# Patient Record
Sex: MALE | Race: WHITE
[De-identification: names, ages, dates, MRNs, and addresses within clinical notes are randomized per-mention and may not be internally consistent; named-entity substitution may affect disease eponyms.]

---

## 2019-10-09 ENCOUNTER — HOSPITAL ENCOUNTER (OUTPATIENT)
Dept: HOSPITAL 92 - SDC | Age: 39
Discharge: HOME | End: 2019-10-09
Attending: SURGERY
Payer: COMMERCIAL

## 2019-10-09 VITALS — BODY MASS INDEX: 37.1 KG/M2

## 2019-10-09 DIAGNOSIS — N50.0: ICD-10-CM

## 2019-10-09 DIAGNOSIS — N99.843: Primary | ICD-10-CM

## 2019-10-09 LAB
ANION GAP SERPL CALC-SCNC: 12 MMOL/L (ref 10–20)
BASOPHILS # BLD AUTO: 0.1 THOU/UL (ref 0–0.2)
BASOPHILS NFR BLD AUTO: 0.7 % (ref 0–1)
BUN SERPL-MCNC: 17 MG/DL (ref 8.9–20.6)
CALCIUM SERPL-MCNC: 9.2 MG/DL (ref 7.8–10.44)
CHLORIDE SERPL-SCNC: 106 MMOL/L (ref 98–107)
CO2 SERPL-SCNC: 23 MMOL/L (ref 22–29)
CREAT CL PREDICTED SERPL C-G-VRATE: 172 ML/MIN (ref 70–130)
EOSINOPHIL # BLD AUTO: 0.1 THOU/UL (ref 0–0.7)
EOSINOPHIL NFR BLD AUTO: 1.2 % (ref 0–10)
GLUCOSE SERPL-MCNC: 97 MG/DL (ref 70–105)
HGB BLD-MCNC: 15.9 G/DL (ref 14–18)
LYMPHOCYTES # BLD: 2.8 THOU/UL (ref 1.2–3.4)
LYMPHOCYTES NFR BLD AUTO: 25 % (ref 21–51)
MCH RBC QN AUTO: 30.6 PG (ref 27–31)
MCV RBC AUTO: 88.4 FL (ref 78–98)
MONOCYTES # BLD AUTO: 0.9 THOU/UL (ref 0.11–0.59)
MONOCYTES NFR BLD AUTO: 8 % (ref 0–10)
NEUTROPHILS # BLD AUTO: 7.2 THOU/UL (ref 1.4–6.5)
NEUTROPHILS NFR BLD AUTO: 65.2 % (ref 42–75)
PLATELET # BLD AUTO: 223 THOU/UL (ref 130–400)
POTASSIUM SERPL-SCNC: 4.4 MMOL/L (ref 3.5–5.1)
RBC # BLD AUTO: 5.21 MILL/UL (ref 4.7–6.1)
SODIUM SERPL-SCNC: 137 MMOL/L (ref 136–145)
WBC # BLD AUTO: 11 THOU/UL (ref 4.8–10.8)

## 2019-10-09 PROCEDURE — 80048 BASIC METABOLIC PNL TOTAL CA: CPT

## 2019-10-09 PROCEDURE — 85025 COMPLETE CBC W/AUTO DIFF WBC: CPT

## 2019-10-09 PROCEDURE — 0V950ZZ DRAINAGE OF SCROTUM, OPEN APPROACH: ICD-10-PCS | Performed by: SURGERY

## 2019-10-09 PROCEDURE — 36415 COLL VENOUS BLD VENIPUNCTURE: CPT

## 2019-10-17 NOTE — PDOC.OP
Operative Note





- Operative Note


Operative Note: 





PROCEDURE: Drainage debridement and imbrication of chronic seroma of the left 

scrotum





SURGEON: Sotero Middleton M.D.





DATE: 10/9/2019





PREOPERATIVE DIAGNOSIS: Chronic seroma of the left scrotum





POSTOPERATIVE DIAGNOSIS: Chronic seroma the left scrotum





HISTORY: Patient is status post emergent repair of massive incarcerated left 

inguinal hernia. He is healed well from this but developed a seroma cavity in 

the potential space remaining after removal of the hernia sac. This has been 

aspirated and drained but has recurred. He was recommended that he return to 

the operating room for debridement and imbrication of the chronic seroma cavity.





PROCEDURE IN DETAIL: After informed consent was obtained and appropriate 

preoperative antibiotics administered the patient was taken to the operating 

room he was placed in supine position and general anesthesia was administered. 

He was prepped and draped in the standard sterile fashion including the entire 

scrotum and penis into the operative prep. An ultrasound was sterilely draped 

and used to identify the location of the seroma where most closely approached 

the skin. Local anesthesia was infused over this and dissection carried down to 

the seroma cavity which was opened and drained. This was immediately adjacent 

to the atrophic testicle but the testicle was not visible. Due to postoperative 

scarring the location of the cord structures could not be clearly identified so 

excision of the seroma was felt to be too likely to endanger the structures. 

Therefore the chronic serosal lies lining of the cavity was extensively 

mechanically debrided and also cauterized and the seroma cavity was imbricated 

with a running V-Lock Vicryl suture. A YARIEL drain was placed into the seroma 

cavity taking care not to include this in the suture imbrication. This was 

drawn out through the left inguinal area laterally and secured to the skin with 

a nylon suture. The subcutaneous tissues were reapproximated with 3-0 Monocryl 

suture and the skin was closed with a running 4-0 subcuticular suture. The 

patient was extubated and taken to recovery in good condition. Estimated blood 

loss was minimal. There were no complications. There were no specimens. The 

imbricated seroma cavity was palpable as a firm mass just medial to the 

atrophic testicle, and the patient was made aware postoperatively that this 

will likely be palpable for some time, perhaps permanently.